# Patient Record
Sex: FEMALE | Race: WHITE | URBAN - METROPOLITAN AREA
[De-identification: names, ages, dates, MRNs, and addresses within clinical notes are randomized per-mention and may not be internally consistent; named-entity substitution may affect disease eponyms.]

---

## 2019-03-19 ENCOUNTER — APPOINTMENT (RX ONLY)
Dept: URBAN - METROPOLITAN AREA CLINIC 34 | Facility: CLINIC | Age: 74
Setting detail: DERMATOLOGY
End: 2019-03-19

## 2019-03-19 DIAGNOSIS — L29.89 OTHER PRURITUS: ICD-10-CM

## 2019-03-19 DIAGNOSIS — L29.8 OTHER PRURITUS: ICD-10-CM

## 2019-03-19 PROBLEM — I10 ESSENTIAL (PRIMARY) HYPERTENSION: Status: ACTIVE | Noted: 2019-03-19

## 2019-03-19 PROBLEM — M12.9 ARTHROPATHY, UNSPECIFIED: Status: ACTIVE | Noted: 2019-03-19

## 2019-03-19 PROBLEM — E13.9 OTHER SPECIFIED DIABETES MELLITUS WITHOUT COMPLICATIONS: Status: ACTIVE | Noted: 2019-03-19

## 2019-03-19 PROCEDURE — 99199 UNLISTED SPECIAL SVC PX/RPRT: CPT

## 2019-03-19 PROCEDURE — ? OTHER

## 2019-03-19 PROCEDURE — ? COUNSELING

## 2019-03-19 ASSESSMENT — LOCATION SIMPLE DESCRIPTION DERM
LOCATION SIMPLE: RIGHT UPPER ARM
LOCATION SIMPLE: LEFT UPPER ARM
LOCATION SIMPLE: UPPER BACK

## 2019-03-19 ASSESSMENT — LOCATION ZONE DERM
LOCATION ZONE: TRUNK
LOCATION ZONE: ARM

## 2019-03-19 ASSESSMENT — LOCATION DETAILED DESCRIPTION DERM
LOCATION DETAILED: LEFT ANTECUBITAL SKIN
LOCATION DETAILED: RIGHT ANTECUBITAL SKIN
LOCATION DETAILED: INFERIOR THORACIC SPINE

## 2019-03-19 NOTE — PROCEDURE: COUNSELING
Patient Specific Counseling (Will Not Stick From Patient To Patient): There is no family history or personal history of eczema. There is asthma in a sister and there is a personal and family history of seasonal allergies. There is not identifiable cause of her pruritus. She would be a candidate for the pruritus study.
Detail Level: Zone

## 2019-04-08 ENCOUNTER — RX ONLY (OUTPATIENT)
Age: 74
Setting detail: RX ONLY
End: 2019-04-08

## 2019-04-16 ENCOUNTER — APPOINTMENT (RX ONLY)
Dept: URBAN - METROPOLITAN AREA CLINIC 34 | Facility: CLINIC | Age: 74
Setting detail: DERMATOLOGY
End: 2019-04-16

## 2019-04-16 VITALS
TEMPERATURE: 98 F | WEIGHT: 165 LBS | DIASTOLIC BLOOD PRESSURE: 75 MMHG | SYSTOLIC BLOOD PRESSURE: 143 MMHG | RESPIRATION RATE: 18 BRPM | HEIGHT: 63.5 IN | HEART RATE: 59 BPM

## 2019-04-16 DIAGNOSIS — L29.89 OTHER PRURITUS: ICD-10-CM

## 2019-04-16 DIAGNOSIS — L29.8 OTHER PRURITUS: ICD-10-CM

## 2019-04-16 DIAGNOSIS — Z00.6 ENCOUNTER FOR EXAMINATION FOR NORMAL COMPARISON AND CONTROL IN CLINICAL RESEARCH PROGRAM: ICD-10-CM

## 2019-04-16 PROBLEM — L57.0 ACTINIC KERATOSIS: Status: ACTIVE | Noted: 2019-04-16

## 2019-04-16 PROBLEM — E78.5 HYPERLIPIDEMIA, UNSPECIFIED: Status: ACTIVE | Noted: 2019-04-16

## 2019-04-16 PROBLEM — L55.1 SUNBURN OF SECOND DEGREE: Status: ACTIVE | Noted: 2019-04-16

## 2019-04-16 PROBLEM — J30.1 ALLERGIC RHINITIS DUE TO POLLEN: Status: ACTIVE | Noted: 2019-04-16

## 2019-04-16 PROBLEM — L70.0 ACNE VULGARIS: Status: ACTIVE | Noted: 2019-04-16

## 2019-04-16 PROCEDURE — 99199 UNLISTED SPECIAL SVC PX/RPRT: CPT

## 2019-04-16 PROCEDURE — ? OTHER

## 2019-04-16 ASSESSMENT — ITCH INTENSITY: HOW SEVERE IS YOUR ITCHING?: 8

## 2019-04-16 NOTE — HPI: PRURITUS
How Did Your Itching Occur?: gradual in onset  (over a period of years)
How Severe Is Your Itching?: severe
Additional History: Patient presents today for screening in the Pruritus MTI-117 study.  Consent was obtained prior to participation in the registry. Informed consent has been reviewed and ample time was given to the patient to ask questions. All questions have been answered to the patient's satisfaction.  A copy of the consent was given to the patient for future reference and contacts regarding participation.  The patient has had problems with pruritus for over 6 months. She rates her pruritus a 8 today.  She is not using a moisturizer on a daily basis. She understands that she will need to either use a moisturizer on a on going basis that we provide or not use any moisturizer during the trial visit. She understands that she may receive a placebo tablet and will need to  be seen 7 times over 15 weeks.

## 2019-04-16 NOTE — PROCEDURE: OTHER
Other (Free Text): No epidermal changes to suggest a cause of the pruritus. There are no linear hemorrhagic crusting in areas of pruritus. She is not moisturizing and will not proceed with moisturizing.
Detail Level: Simple
Note Text (......Xxx Chief Complaint.): This diagnosis correlates with the
Other (Free Text): A thorough screening of the patient's chart was completed to identify inclusion criteria per the protocol. Patient_____ meets all inclusion criteria and has no exclusion criteria. The study requirements and expectations were reviewed with the patient. \\n